# Patient Record
Sex: MALE | Race: WHITE | NOT HISPANIC OR LATINO | Employment: FULL TIME | URBAN - METROPOLITAN AREA
[De-identification: names, ages, dates, MRNs, and addresses within clinical notes are randomized per-mention and may not be internally consistent; named-entity substitution may affect disease eponyms.]

---

## 2024-07-30 ENCOUNTER — OFFICE VISIT (OUTPATIENT)
Dept: URGENT CARE | Facility: MEDICAL CENTER | Age: 64
End: 2024-07-30
Payer: COMMERCIAL

## 2024-07-30 VITALS
OXYGEN SATURATION: 99 % | HEART RATE: 77 BPM | DIASTOLIC BLOOD PRESSURE: 84 MMHG | TEMPERATURE: 96.4 F | WEIGHT: 239 LBS | SYSTOLIC BLOOD PRESSURE: 142 MMHG | RESPIRATION RATE: 20 BRPM

## 2024-07-30 DIAGNOSIS — S90.424A BLISTER OF TOE OF RIGHT FOOT WITH INFECTION, INITIAL ENCOUNTER: Primary | ICD-10-CM

## 2024-07-30 DIAGNOSIS — Z86.39 HX OF DIABETES MELLITUS: ICD-10-CM

## 2024-07-30 DIAGNOSIS — L08.9 BLISTER OF TOE OF RIGHT FOOT WITH INFECTION, INITIAL ENCOUNTER: Primary | ICD-10-CM

## 2024-07-30 PROBLEM — Z01.810 PREOPERATIVE CARDIOVASCULAR EXAMINATION: Status: ACTIVE | Noted: 2020-02-25

## 2024-07-30 PROBLEM — I45.10 RBBB: Status: ACTIVE | Noted: 2020-02-25

## 2024-07-30 PROBLEM — I49.3 PVC'S (PREMATURE VENTRICULAR CONTRACTIONS): Status: ACTIVE | Noted: 2020-02-25

## 2024-07-30 PROBLEM — H25.11 NUCLEAR SCLEROTIC CATARACT OF RIGHT EYE: Status: ACTIVE | Noted: 2020-05-14

## 2024-07-30 PROCEDURE — S9083 URGENT CARE CENTER GLOBAL: HCPCS | Performed by: NURSE PRACTITIONER

## 2024-07-30 PROCEDURE — G0382 LEV 3 HOSP TYPE B ED VISIT: HCPCS | Performed by: NURSE PRACTITIONER

## 2024-07-30 RX ORDER — DOXYCYCLINE 100 MG/1
100 TABLET ORAL 2 TIMES DAILY
Qty: 14 TABLET | Refills: 0 | Status: SHIPPED | OUTPATIENT
Start: 2024-07-30 | End: 2024-08-06

## 2024-07-30 RX ORDER — LORATADINE 10 MG/1
10 TABLET ORAL
COMMUNITY

## 2024-07-30 NOTE — PROGRESS NOTES
[facility]        NAME: Atilio Jackson is a 64 y.o. male  : 1960    MRN: [id number]  DATE: 2024  TIME: 9:35 AM    Assessment and Plan   Blister of toe of right foot with infection, initial encounter [S90.424A, L08.9]  1. Blister of toe of right foot with infection, initial encounter  doxycycline (ADOXA) 100 MG tablet      2. Hx of diabetes mellitus          Patient in no acute distress and vital signs are stable upon exam.  Discussed with patient importance of footcare with diabetes.  Patient appears to be developing a diabetic ulcer to his third right toe.  Will cover for infection and recommend patient follow-up with podiatry as soon as possible.    Patient Instructions       Follow up with PCP in 3-5 days.  Proceed to  ER if symptoms worsen.    If tests have been performed at [facility], our office will contact you with results if changes need to be made to the care plan discussed with you at the visit.  You can review your full results on [facility].    Chief Complaint     Chief Complaint   Patient presents with    Patient with disolcoration to the right 3rd toe     Patient with discoloration to the right 3rd toe along with numbness to both feet         History of Present Illness       Reports recent DM diagnosis approx 1 month ago after A1C 9.3  Started on Metformin 2 weeks ago  Right foot, third toe started 2 days ago with discoloration  Denies any injuries, wears steel toe shoes to work  Reports decreased sensation to toes  Has referral to Neurology for tremors        Review of Systems   Review of Systems   Musculoskeletal:         Decreased sensation to toes     Skin:  Positive for color change and wound.   All other systems reviewed and are negative.        Current Medications       Current Outpatient Medications:     doxycycline (ADOXA) 100 MG tablet, Take 1 tablet (100 mg total) by mouth 2 (two) times a day for 7 days, Disp: 14 tablet, Rfl: 0    metFORMIN (GLUCOPHAGE) 500 mg  tablet, TAKE 2 TABLETS BY MOUTH TWICE DAILY WITH THE MORNING AND EVENING MEAL, Disp: , Rfl:     loratadine (CLARITIN) 10 mg tablet, Take 10 mg by mouth, Disp: , Rfl:     Current Allergies     Allergies as of 07/30/2024    (No Known Allergies)            The following portions of the patient's history were reviewed and updated as appropriate: allergies, current medications, past family history, past medical history, past social history, past surgical history and problem list.     No past medical history on file.    No past surgical history on file.    No family history on file.      Medications have been verified.        Objective   /84   Pulse 77   Temp (!) 96.4 °F (35.8 °C) (Tympanic)   Resp 20   Wt 108 kg (239 lb)   SpO2 99%   No LMP for male patient.       Physical Exam     Physical Exam  Constitutional:       General: He is not in acute distress.     Appearance: Normal appearance. He is not ill-appearing.   HENT:      Head: Normocephalic and atraumatic.   Cardiovascular:      Rate and Rhythm: Normal rate.   Pulmonary:      Effort: Pulmonary effort is normal.   Musculoskeletal:        Feet:    Skin:     General: Skin is warm and dry.   Neurological:      Mental Status: He is alert.